# Patient Record
(demographics unavailable — no encounter records)

---

## 2025-06-09 NOTE — HISTORY OF PRESENT ILLNESS
[FreeTextEntry1] : 57-year-old female who presents for well woman exam p 2022.  Patient states recent new partner has not had relations yet.  Patient with significant medical history of left breast CA lumpectomy in 2022.,  Status post radiation and Herceptin.  Patient is followed at [facility] for breast team.  Patient states status post antibiotics for dental work states felt mildly itchy wishes cultured for yeast..  Patient states had recent mammo and sono at [facility] 1 month ago."  All was normal"

## 2025-06-09 NOTE — PLAN
[FreeTextEntry1] : 70-year-old female with LMP greater than 2 years ago for well woman exam planing of mild vaginal itching status post antibiotics for dental work.  Patient with significant medical history of left breast CA status post radiation and lumpectomy and Herceptin followed at [facility] [place].  Recent mammography and breast ultrasound "1 month ago" at [facility]" within normal limits per patient.  Patient states started see new partner has not yet had intercourse wishes STD testing for baseline.  Breast exam performed today unremarkable notes small left breast incision at site of lumpectomy.  No lymphadenopathy bilaterally.  Abdomen was soft nontender small umbilical hernia noted.  Vaginal exam Pap GC chlamydia and vaginal NAAT cultures were obtained stool guaiac was negative.  Advised patient to continue self breast exams will call with any results returned positive.  Safe sex protocols reviewed with patient.  Patient states bone density will be done with PCP.  Patient states she is up-to-date with colonoscopy and will call to schedule.  Advised patient RTO 1 year or as needed

## 2025-06-09 NOTE — PHYSICAL EXAM
[Chaperoned Physical Exam] : A chaperone was present in the examining room during all aspects of the physical examination. [MA] : MA [FreeTextEntry2] : tiffani [Appropriately responsive] : appropriately responsive [No Lymphadenopathy] : no lymphadenopathy [FreeTextEntry6] : left Breast lumpectomy. [Examination Of The Breasts] : a normal appearance [No Masses] : no breast masses were palpable [Labia Majora] : normal [Labia Minora] : normal [Normal] : normal [Uterine Adnexae] : normal [Normal rectal exam] : was normal [Normal Brown Stool] : was normal and brown [Occult Blood Positive] : was negative for occult blood analysis [FreeTextEntry5] : Pap GC chlamydia culture obtained and vaginal NAAT cx. [FreeTextEntry8] : Cervix closed uterus small nontender.